# Patient Record
Sex: FEMALE | Race: WHITE
[De-identification: names, ages, dates, MRNs, and addresses within clinical notes are randomized per-mention and may not be internally consistent; named-entity substitution may affect disease eponyms.]

---

## 2019-01-09 LAB
HCT VFR BLD CALC: 38.1 % (ref 36–45)
LYMPHOCYTES # SPEC AUTO: 1.1 K/UL (ref 0.7–4.9)
PMV BLD: 8.7 FL (ref 7.6–11.3)
RBC # BLD: 4.21 M/UL (ref 3.86–4.86)
UA COMPLETE W REFLEX CULTURE PNL UR: (no result)
UA DIPSTICK W REFLEX MICRO PNL UR: (no result)

## 2019-01-15 ENCOUNTER — HOSPITAL ENCOUNTER (OUTPATIENT)
Dept: HOSPITAL 97 - OR | Age: 69
Discharge: HOME | End: 2019-01-15
Attending: OBSTETRICS & GYNECOLOGY
Payer: COMMERCIAL

## 2019-01-15 DIAGNOSIS — E03.9: ICD-10-CM

## 2019-01-15 DIAGNOSIS — I10: ICD-10-CM

## 2019-01-15 DIAGNOSIS — N95.0: Primary | ICD-10-CM

## 2019-01-15 DIAGNOSIS — E78.5: ICD-10-CM

## 2019-01-15 DIAGNOSIS — Z85.3: ICD-10-CM

## 2019-01-15 DIAGNOSIS — Z85.038: ICD-10-CM

## 2019-01-15 PROCEDURE — 85025 COMPLETE CBC W/AUTO DIFF WBC: CPT

## 2019-01-15 PROCEDURE — 0UJD8ZZ INSPECTION OF UTERUS AND CERVIX, VIA NATURAL OR ARTIFICIAL OPENING ENDOSCOPIC: ICD-10-PCS

## 2019-01-15 PROCEDURE — 58558 HYSTEROSCOPY BIOPSY: CPT

## 2019-01-15 PROCEDURE — 81015 MICROSCOPIC EXAM OF URINE: CPT

## 2019-01-15 PROCEDURE — 86900 BLOOD TYPING SEROLOGIC ABO: CPT

## 2019-01-15 PROCEDURE — 82962 GLUCOSE BLOOD TEST: CPT

## 2019-01-15 PROCEDURE — 0UDB7ZX EXTRACTION OF ENDOMETRIUM, VIA NATURAL OR ARTIFICIAL OPENING, DIAGNOSTIC: ICD-10-PCS

## 2019-01-15 PROCEDURE — 86901 BLOOD TYPING SEROLOGIC RH(D): CPT

## 2019-01-15 PROCEDURE — 36415 COLL VENOUS BLD VENIPUNCTURE: CPT

## 2019-01-15 PROCEDURE — 86850 RBC ANTIBODY SCREEN: CPT

## 2019-01-15 PROCEDURE — 88305 TISSUE EXAM BY PATHOLOGIST: CPT

## 2019-01-15 PROCEDURE — 87088 URINE BACTERIA CULTURE: CPT

## 2019-01-15 PROCEDURE — 81003 URINALYSIS AUTO W/O SCOPE: CPT

## 2019-01-15 PROCEDURE — 87086 URINE CULTURE/COLONY COUNT: CPT

## 2019-01-15 RX ADMIN — LIDOCAINE HYDROCHLORIDE AND EPINEPHRINE ONE ML: 10; 10 INJECTION, SOLUTION INFILTRATION; PERINEURAL at 07:34

## 2019-01-15 RX ADMIN — LIDOCAINE HYDROCHLORIDE AND EPINEPHRINE ONE ML: 10; 10 INJECTION, SOLUTION INFILTRATION; PERINEURAL at 08:12

## 2019-01-15 RX ADMIN — LIDOCAINE HYDROCHLORIDE AND EPINEPHRINE ONE ML: 10; 10 INJECTION, SOLUTION INFILTRATION; PERINEURAL at 08:14

## 2019-01-15 NOTE — OP
Date of Procedure:  01/15/2019



Surgeon:  Lavern Lazaro MD



Preoperative Diagnosis:  Postmenopausal bleeding.



Postoperative Diagnosis:  Postmenopausal bleeding.



Procedures Performed:  Hysteroscopy, dilation and curettage.



Complications:  None.



Drains:  None.



Specimens:  Endometrial curettings, very scant amount.



Condition:  Stable.



Findings:  Endometrial cavity empty.  Endometrial lining was very thin.  Both tubal ostia were visual
ized. 



Stage II anterior wall prolapse with left paravaginal defect.



Description Of Procedure:  After informed consent was verified, the patient was taken back to the OR,
 placed in a supine fashion on the operating table.  After MAC was given, she was placed in a dorsal 
lithotomy position using Tristian stirrups.  Pelvic exam was performed.  There was a very obvious left p
aravaginal defect in the anterolateral wall.  Otherwise, unremarkable atrophic endometrium.  Anterior
 lip of the cervix was injected with 1% lidocaine mixed with 1:100,000 epinephrine, after it was expo
sed with a speculum, 8 cc was injected here, then 5 cc each on 4 and 8 o'clock positions of the cervi
covaginal junction for a block.  Once all this was done, prep x3 with Betadine was done.  A diagnosti
c hysteroscope was introduced through the cervical canal into the uterine cavity.  Cavity was complet
ely empty and atrophic.  Scope removed.  Curettings performed with a 0 curette.  There was a very sca
nt sample that was retrieved.  All instruments were removed.  Instrument, needle, and sponge counts 

were done and were correct at the end of the case.  The patient tolerated the procedure well.  She wi
ll follow up with me in 1 week.





SK/MICHELLE

DD:  01/15/2019 08:31:51Voice ID:  [id number]

DT:  01/15/2019 19:11:35Report ID:  [id number]